# Patient Record
Sex: MALE | Race: WHITE
[De-identification: names, ages, dates, MRNs, and addresses within clinical notes are randomized per-mention and may not be internally consistent; named-entity substitution may affect disease eponyms.]

---
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
## 2019-01-14 NOTE — ER DOCUMENT REPORT
HPI





- HPI


Time Seen by Provider: 01/14/19 16:46


Pain Level: 4


Notes: 





Patient is an otherwise healthy 23-year-old male who presents with chief 

complaint of fever, sore throat, cough, congestion and shortness of breath over 

the last 2 days.  He states that he has been taking over-the-counter medications

at home with minimal relief.  He denies any past medical history and reports all

immunizations are up-to-date.








- CONSTITUTIONAL


Constitutional: REPORTS: Fever, Chills





- NEURO


Neurology: REPORTS: Weakness - generallized





Past Medical History





- General


Information source: Patient





- Social History


Smoking Status: Current Every Day Smoker


Frequency of alcohol use: None


Drug Abuse: None


Family History: Reviewed & Not Pertinent


Patient has suicidal ideation: No


Patient has homicidal ideation: No





- Medical History


Medical History: Negative


Renal/ Medical History: Denies: Hx Peritoneal Dialysis


Surgical Hx: Negative





- Immunizations


Immunizations up to date: Yes





Vertical Provider Document





- CONSTITUTIONAL


Notes: 





PHYSICAL EXAMINATION:





GENERAL: Well-appearing, well-nourished and in no acute distress.





HEAD: Atraumatic, normocephalic.





EYES: Pupils equal round extraocular movements intact,  conjunctiva are normal.





ENT: Nares patent with clear rhinorrhea.  Bilateral TMs unremarkable.  

Oropharynx mildly erythematous without any tonsillar swelling or exudates noted.





NECK: Normal range of motion





LUNGS: No respiratory distress, bilateral expiratory wheezing noted.





Musculoskeletal: Normal range of motion





NEUROLOGICAL:  Normal speech, normal gait. 





PSYCH: Normal mood, normal affect.





SKIN: Warm, Dry, normal turgor, no rashes or lesions noted.





- INFECTION CONTROL


TRAVEL OUTSIDE OF THE [place] IN LAST 30 DAYS: No





Course





- Re-evaluation


Re-evalutation: 





Patient's lung sounds improved after administration of breathing treatment.  Flu

test is negative.  Likely viral upper respiratory illness.  Patient will be 

discharged home in stable condition. 








- Vital Signs


Vital signs: 


                                        











Temp Pulse Resp BP Pulse Ox


 


 100.8 F H  103 H  16   125/76   97 


 


 01/14/19 15:39  01/14/19 15:39  01/14/19 15:39  01/14/19 15:39  01/14/19 15:39














Discharge





- Discharge


Clinical Impression: 


 Bronchitis





Condition: Stable


Disposition: HOME, SELF-CARE


Additional Instructions: 


BRONCHITIS:





     You have acute bronchitis.  This disease is an infection or inflammation of

the air passageways in your lungs.  Symptoms usually include cough, low grade 

fever, shortness of breath, and wheezing.  The cough usually persists for a 

couple of weeks.


     Most cases of bronchitis get better without antibiotics.  We prescribe 

antibiotics when we believe bacteria are damaging your airways, or if there's 

high risk the bronchitis will worsen into pneumonia.


     Increase your fluid intake. A cool mist humidifier may make your lungs more

comfortable.  An expectorant (cough medicine that loosens phlegm) can help.  If 

you smoke, STOP!!!  Recovery from bronchitis can be somewhat slow, but you 

should see improvement within a day or two.


     Repeated episodes of bronchitis may result in lung damage -- for example, 

chronic bronchitis, recurrent pneumonias, or emphysema.


     Call the doctor if you develop increasing fever, shortness of breath, chest

pain, bloody sputum, or otherwise worsen.  If you have not improved at all after

several days, contact the physician.








BRONCHITIS WITH BRONCHOSPASM (WHEEZING):





     You have  bronchitis with bronchospasm (wheezing).  Sometimes people 

develop wheezing with a chest cold.  This occurs either because of an underlying

tendency toward asthma or because the virus itself irritates the bronchial 

tubes.  This irritation causes cough, shortness of breath, and wheezing.


     Emergency treatment of bronchospasm may include adrenaline shots or 

bronchodilator aerosol.  You may feel lightheaded and have a rapid pulse for an 

hour or two.  Rest and get plenty of fluids.


     At home, we'll treat you with a bronchodilator inhaler. Corticosteroids may

be required for some patients. Until you recover, avoid chemical fumes, dusts, 

pollens, and exercising in very cold or dry air. If you smoke, stop now!


     Most cases of bronchitis get better without antibiotics.  We prescribe 

antibiotics when we believe bacteria are damaging your airways, or if there's 

high risk the bronchitis will worsen into pneumonia. Increase your fluid intake.

A cool mist humidifier may make your lungs more comfortable.  An expectorant 

(cough medicine that loosens phlegm) can help.


     Repeated episodes of bronchitis and bronchospasm may result in lung damage 

-- for example, chronic bronchitis, recurrent pneumonias, or emphysema. If you 

develop a fever, increased wheezing, chest pain, or severe shortness of breath, 

you should contact the doctor immediately.








DECONGESTANT MEDICATION:


     A decongestant medicine has been prescribed.  Often this medicine is 

combined in the same tablet with an antihistamine or expectorant. This type of 

medicine is helpful in treating a bad cold or sinus condition, as well as in 

treatment of the nasal congestion of hay fever.  It is not of much benefit for 

lung infections.


     Decongestant medicines are related to stimulants.  They can cause an 

increase in blood pressure and heart rate.  Persons with heart disease and high 

blood pressure should not take decongestants without discussing this with the 

physician.


     If you develop palpitations, chest pain, headache, or tremors, stop the 

medicine and consult your physician.








COUGH-SUPPRESSANT & EXPECTORANT MEDICATION:


     You are to use a cough medication as needed for relief of symptoms.  This 

medicine is a combination of an expectorant (to make the mucous thinner and more

easily "coughed up") and a cough suppressant (to reduce the frequency of 

coughing).


     The cough-suppressant medicine is related to narcotics.  You may experience

mild nausea and sleepiness.  Some patients who are very sensitive to narcotics 

may have stomach pain from this medicine. Taking the medicine with food reduces 

these side effects.  Do not drive or work with machinery until you know how this

medicine affects you.


     The expectorant should have no side effects.  Iodine-containing 

expectorants (such as organidin) should not be taken by persons with active 

thyroid disease unless approved by your doctor.


     Call the doctor if you develop shortness of breath, hives, rash, itching, 

lightheadedness, or severe nausea and vomiting.








INHALED BRONCHODILATORS:


     You have received a treatment of and/or prescription for an inhaled 

bronchodilator -- a medication which stimulates the airways in the lung to 

dilate.  This improves the flow of air in asthma, bronchitis, and emphysema.


     These medicines have some similarity to adrenaline, and can cause similar 

side effects:  shakiness, racing heart, and a sense of nervousness.  These side 

effects decrease with time.  Contact your doctor if these side effects are 

severe.


     Do not over-use the medicine.  Too-frequent use of the inhaler may make it 

ineffective.  Call your doctor if the inhaler is not controlling your symptoms 

at the prescribed doses.








STEROID MEDICATION:


     You have been given an injection of or oral medicine of the 

cortisone/steroid class.  This medication is used to control inflammation or 

allergy.  Sachin t is usually only given for a short period of time, until the 

acute process subsides.


     There are usually no side effects from short-term use of cortisone-like 

medications.  Some persons feel an increased sense of well-being and are not 

sleepy at bedtime.  Long-term use of cortisone medications is best avoided, 

unless required for a severe condition.  If your condition does not remit, or 

relapses after the course of corticosteroid medication, you should consult your 

physician.








ANTIBIOTIC THERAPY:


     You have been given an antibiotic prescription.  It's important that you 

take all the medication, unless instructed otherwise by your physician.  Failure

to complete the entire course can result in relapse of your condition.


     Common side effects of antibiotics include nausea, intestinal cramping, or 

diarrhea.  Women may develop vaginal yeast infections, and babies can get yeast 

(thrush) in the mouth following the use of antibiotics.  Contact your physician 

if you develop significant side effects from this medication.


     Allergy to this antibiotic can result in hives, wheezing, faintness, or 

itching.  If symptoms of allergy occur, stop the medication and call your 

doctor.








USE OF ACETAMINOPHEN (Tylenol):


     Acetaminophen may be taken for pain relief or fever control. It's much 

safer than aspirin, offering a wider range of "safe" dosages.  It is safe during

pregnancy.  Some brand names are Tylenol, Panadol, Datril, Anacin 3, Tempra, and

Liquiprin. Acetaminophen can be repeated every four hours.  The following are 

maximum recommended dosages:


>89 pounds or adults          650 mg to 900 mg


Acetaminophen can be repeated every four hours.  Maximum dose not to exceed 4000

mg a day.








SMOKING:


     If you smoke, you should stop smoking.  The tar and chemicals in cigarette 

smoke are harmful.  Smoking has been shown to cause:


          emphysema


          chronic bronchitis


          lung cancer


          mouth and throat cancer


          stomach and pancreas cancer


          premature aging


          birth defects


     In addition, smoking increases ear and lung infections in children of 

smokers.








FOLLOW-UP CARE:


If you have been referred to a physician for follow-up care, call the 

physicians office for an appointment as you were instructed or within the next 

two days.  If you experience worsening or a significant change in your symptoms,

notify the physician immediately or return to the Emergency Department at any 

time for re-evaluation.





Prescriptions: 


Benzonatate [Tessalon Perles 100 mg Capsule] 100 mg PO Q8HP PRN #40 capsule


 PRN Reason: 


Fluticasone Propionate [Flonase Nasal Spray 50 Mcg/Spray 16 gm] 2 sprays NASL 

Q12 #1 inhaler


Prednisone [Deltasone 20 mg Tablet] 3 tab PO DAILY 4 Days #12 tablet


Forms:  Return to Work

## 2019-02-25 ENCOUNTER — HOSPITAL ENCOUNTER (EMERGENCY)
Dept: HOSPITAL 62 - ER | Age: 24
Discharge: LEFT BEFORE DISCHARGE | End: 2019-02-25
Payer: SELF-PAY

## 2019-02-25 VITALS — SYSTOLIC BLOOD PRESSURE: 129 MMHG | DIASTOLIC BLOOD PRESSURE: 98 MMHG

## 2019-02-25 DIAGNOSIS — Z53.21: Primary | ICD-10-CM

## 2019-07-17 ENCOUNTER — HOSPITAL ENCOUNTER (EMERGENCY)
Dept: HOSPITAL 62 - ER | Age: 24
LOS: 1 days | Discharge: HOME | End: 2019-07-18
Payer: COMMERCIAL

## 2019-07-17 VITALS — DIASTOLIC BLOOD PRESSURE: 94 MMHG | SYSTOLIC BLOOD PRESSURE: 174 MMHG

## 2019-07-17 DIAGNOSIS — R00.2: ICD-10-CM

## 2019-07-17 DIAGNOSIS — F41.1: Primary | ICD-10-CM

## 2019-07-17 DIAGNOSIS — R07.9: ICD-10-CM

## 2019-07-17 LAB
ADD MANUAL DIFF: NO
ALBUMIN SERPL-MCNC: 5.3 G/DL (ref 3.5–5)
ALP SERPL-CCNC: 67 U/L (ref 38–126)
ALT SERPL-CCNC: 29 U/L (ref 21–72)
ANION GAP SERPL CALC-SCNC: 12 MMOL/L (ref 5–19)
APPEARANCE UR: CLEAR
APTT PPP: (no result) S
AST SERPL-CCNC: 24 U/L (ref 17–59)
BASOPHILS # BLD AUTO: 0 10^3/UL (ref 0–0.2)
BASOPHILS NFR BLD AUTO: 0.2 % (ref 0–2)
BILIRUB DIRECT SERPL-MCNC: 0.1 MG/DL (ref 0–0.4)
BILIRUB SERPL-MCNC: 0.7 MG/DL (ref 0.2–1.3)
BILIRUB UR QL STRIP: NEGATIVE
BUN SERPL-MCNC: 14 MG/DL (ref 7–20)
CALCIUM: 10.2 MG/DL (ref 8.4–10.2)
CHLORIDE SERPL-SCNC: 101 MMOL/L (ref 98–107)
CO2 SERPL-SCNC: 28 MMOL/L (ref 22–30)
EOSINOPHIL # BLD AUTO: 0 10^3/UL (ref 0–0.6)
EOSINOPHIL NFR BLD AUTO: 0.1 % (ref 0–6)
ERYTHROCYTE [DISTWIDTH] IN BLOOD BY AUTOMATED COUNT: 13.2 % (ref 11.5–14)
GLUCOSE SERPL-MCNC: 103 MG/DL (ref 75–110)
GLUCOSE UR STRIP-MCNC: NEGATIVE MG/DL
HCT VFR BLD CALC: 46.5 % (ref 37.9–51)
HGB BLD-MCNC: 16.4 G/DL (ref 13.5–17)
KETONES UR STRIP-MCNC: NEGATIVE MG/DL
LYMPHOCYTES # BLD AUTO: 1.1 10^3/UL (ref 0.5–4.7)
LYMPHOCYTES NFR BLD AUTO: 8.3 % (ref 13–45)
MCH RBC QN AUTO: 28.7 PG (ref 27–33.4)
MCHC RBC AUTO-ENTMCNC: 35.3 G/DL (ref 32–36)
MCV RBC AUTO: 81 FL (ref 80–97)
MONOCYTES # BLD AUTO: 1 10^3/UL (ref 0.1–1.4)
MONOCYTES NFR BLD AUTO: 7 % (ref 3–13)
NEUTROPHILS # BLD AUTO: 11.6 10^3/UL (ref 1.7–8.2)
NEUTS SEG NFR BLD AUTO: 84.4 % (ref 42–78)
NITRITE UR QL STRIP: NEGATIVE
PH UR STRIP: 6 [PH] (ref 5–9)
PLATELET # BLD: 220 10^3/UL (ref 150–450)
POTASSIUM SERPL-SCNC: 4.3 MMOL/L (ref 3.6–5)
PROT SERPL-MCNC: 8.5 G/DL (ref 6.3–8.2)
PROT UR STRIP-MCNC: NEGATIVE MG/DL
RBC # BLD AUTO: 5.73 10^6/UL (ref 4.35–5.55)
SODIUM SERPL-SCNC: 141.4 MMOL/L (ref 137–145)
SP GR UR STRIP: 1
TOTAL CELLS COUNTED % (AUTO): 100 %
UROBILINOGEN UR-MCNC: NEGATIVE MG/DL (ref ?–2)
WBC # BLD AUTO: 13.8 10^3/UL (ref 4–10.5)

## 2019-07-17 PROCEDURE — 93010 ELECTROCARDIOGRAM REPORT: CPT

## 2019-07-17 PROCEDURE — 84443 ASSAY THYROID STIM HORMONE: CPT

## 2019-07-17 PROCEDURE — 85025 COMPLETE CBC W/AUTO DIFF WBC: CPT

## 2019-07-17 PROCEDURE — 81001 URINALYSIS AUTO W/SCOPE: CPT

## 2019-07-17 PROCEDURE — 99284 EMERGENCY DEPT VISIT MOD MDM: CPT

## 2019-07-17 PROCEDURE — 36415 COLL VENOUS BLD VENIPUNCTURE: CPT

## 2019-07-17 PROCEDURE — 80053 COMPREHEN METABOLIC PANEL: CPT

## 2019-07-17 PROCEDURE — 93005 ELECTROCARDIOGRAM TRACING: CPT

## 2019-07-17 PROCEDURE — 71046 X-RAY EXAM CHEST 2 VIEWS: CPT

## 2019-07-17 NOTE — ER DOCUMENT REPORT
ED General





- General


Chief Complaint: Chest Pain


Stated Complaint: PANIC ATTACK


Time Seen by Provider: 07/17/19 21:59


TRAVEL OUTSIDE OF THE [place] IN LAST 30 DAYS: No





- Related Data


Allergies/Adverse Reactions: 


                                        





No Known Allergies Allergy (Unverified 01/14/19 15:37)


   











Past Medical History





- Social History


Family History: Reviewed & Not Pertinent


Renal/ Medical History: Denies: Hx Peritoneal Dialysis





- Immunizations


Immunizations up to date: Yes





Physical Exam





- Vital signs


Vitals: 





                                        











Temp Pulse Resp BP Pulse Ox


 


 98.5 F   88   20   174/94 H  99 


 


 07/17/19 20:38  07/17/19 20:38  07/17/19 20:38  07/17/19 20:38  07/17/19 20:38














Course





- Vital Signs


Vital signs: 





                                        











Temp Pulse Resp BP Pulse Ox


 


 98.5 F   88   20   174/94 H  99 


 


 07/17/19 20:38  07/17/19 20:38  07/17/19 20:38  07/17/19 20:38  07/17/19 20:38














- Laboratory


Result Diagrams: 


                                 07/17/19 22:45





                                 07/17/19 22:45





- EKG Interpretation by Me


Additional EKG results interpreted by me: 





07/17/19 22:53


EKG is reviewed and interpreted by me.  EKG shows sinus rhythm with a rate of 92

bpm.  No ST segment elevation or depression.  No ischemic T wave inversions.  NV

interval, QRS duration, QT intervals are within normal range.  No old EKG 

available for comparison.

## 2019-07-17 NOTE — RADIOLOGY REPORT (SQ)
EXAM DESCRIPTION: 



XR CHEST 2 VIEWS



COMPLETED DATE/TME:  07/17/2019 22:03



CLINICAL HISTORY: 



23 years, Male, chest pain, palpitations



COMPARISON:

None.



NUMBER OF VIEWS:





TECHNIQUE:





LIMITATIONS:

None.



FINDINGS:



No evidence of pulmonary infiltrate or pleural effusion.



The heart and mediastinum are unremarkable.



Pulmonary vascularity appears normal.



IMPRESSION:



Normal chest x-ray.





copyright 2011 [employer]- All Rights Reserved

## 2019-07-17 NOTE — ER DOCUMENT REPORT
ED Medical Screen (RME)





- General


Chief Complaint: Chest Pain


Stated Complaint: PANIC ATTACK


Time Seen by Provider: 07/17/19 21:59


Notes: 





Patient is a 23-year-old male presents to the emergency department with a chief 

complaint of palpitations.  Patient states that recently he has had panic 

attacks a daily for the past 2 to 3 weeks.  Mother states that patient was on 

antianxiety medication while in high school but was taken off of the medications

as he was doing well.  Patient states he is not under any recent stress and is 

not sure why he is having these symptoms.  Patient states he is not having chest

pain but a feeling of palpitations at this time.  States he did have chest 

discomfort located on the right side earlier today.  Patient states on the way 

here he was driving and felt like he was hyperventilating and was going to pass 

out.  Patient states he feels very tired and restless.  Mother states he has a 

history of an irregular heartbeat but he was diagnosed at age 14 or 15 but has 

not had any issues with this since.  Patient does have a psychology appointment 

on August 2.


TRAVEL OUTSIDE OF THE [place] IN LAST 30 DAYS: No





- Related Data


Allergies/Adverse Reactions: 


                                        





No Known Allergies Allergy (Unverified 01/14/19 15:37)


   











Past Medical History


Renal/ Medical History: Denies: Hx Peritoneal Dialysis





- Immunizations


Immunizations up to date: Yes





Physical Exam





- Vital signs


Vitals: 





                                        











Temp Pulse Resp BP Pulse Ox


 


 98.5 F   88   20   174/94 H  99 


 


 07/17/19 20:38  07/17/19 20:38  07/17/19 20:38  07/17/19 20:38  07/17/19 20:38














- Abdominal


Inspection: Normal


Distension: No distension


Bowel sounds: Normal


Tenderness: Nontender


Organomegaly: No organomegaly





Course





- Re-evaluation


Re-evalutation: 





07/17/19 22:02


I have greeted and performed a rapid initial assessment of this patient.  A co

mprehensive ED assessment and evaluation of the patient, analysis of test 

results and completion of the medical decision making process will be conducted 

by additional ED providers.








- Vital Signs


Vital signs: 





                                        











Temp Pulse Resp BP Pulse Ox


 


 98.5 F   88   20   174/94 H  99 


 


 07/17/19 20:38  07/17/19 20:38  07/17/19 20:38  07/17/19 20:38  07/17/19 20:38

## 2019-07-18 NOTE — ER DOCUMENT REPORT
ED General





- General


Chief Complaint: Chest Pain


Stated Complaint: PANIC ATTACK


Time Seen by Provider: 07/17/19 21:59


Notes: 


23-year-old male presents to the emergency department with a chief complaint of 

palpitations.  Patient states that recently he has had panic attacks a daily for

the past 2 to 3 weeks.  Patient states he was on Lexapro and Wellbutrin while in

high school but was taken off of the medications as he was doing well.  Patient 

states he is not under any recent stress and is not sure why he is having these 

symptoms.  Patient states he is not having chest pain but a feeling of 

palpitations that have resolved at this time.  States he did have chest 

discomfort located on the right side earlier today.  On the way here he was 

driving and felt like he was hyperventilating and was going to pass out.  

Patient states he feels very tired and restless.  Mother states he has a history

of an irregular heartbeat but he was diagnosed at age 14 or 15 but has not had 

any issues with this since.  Patient does have a psychology appointment on 

August 2.


TRAVEL OUTSIDE OF THE [place] IN LAST 30 DAYS: No





- Related Data


Allergies/Adverse Reactions: 


                                        





No Known Allergies Allergy (Unverified 01/14/19 15:37)


   











Past Medical History





- Social History


Smoking Status: Unknown if Ever Smoked


Family History: Reviewed & Not Pertinent


Renal/ Medical History: Denies: Hx Peritoneal Dialysis





- Immunizations


Immunizations up to date: Yes





Review of Systems





- Review of Systems


Constitutional: No symptoms reported


EENT: No symptoms reported


Cardiovascular: See HPI


Respiratory: See HPI


Gastrointestinal: No symptoms reported


Genitourinary: No symptoms reported


Male Genitourinary: No symptoms reported


Musculoskeletal: No symptoms reported


Skin: No symptoms reported


Hematologic/Lymphatic: No symptoms reported


Neurological/Psychological: See HPI





Physical Exam





- Vital signs


Vitals: 


                                        











Temp Pulse Resp BP Pulse Ox


 


 98.5 F   88   20   174/94 H  99 


 


 07/17/19 20:38  07/17/19 20:38  07/17/19 20:38  07/17/19 20:38  07/17/19 20:38














- Notes


Notes: 


PHYSICAL EXAMINATION:





Reviewed vital signs and charting by RN





GENERAL: Alert, interacts well. No acute distress.


HEAD: Normocephalic, atraumatic.


EYES: Pupils equal and round. Extraocular movements intact.


ENT: Oral mucosa moist, tongue midline. 


NECK: Full range of motion. Trachea midline.


LUNGS: Clear to auscultation bilaterally, no wheezes, rales, or rhonchi. No 

respiratory distress.


HEART: Regular rate and rhythm. No murmur


ABDOMEN: soft, non-tender.  No distention. Bowel sounds present


EXTREMITIES: Moves all 4 extremities spontaneously. No edema,  No cyanosis.


PSYCH: Normal affect, normal mood.


SKIN: Warm, dry, normal turgor. No rashes or lesions noted.








Course





- Re-evaluation


Re-evalutation: 





07/18/19 02:32


Patient is well-appearing and states that his symptoms have resolved.  He is 

having daily panic attacks for the last couple of weeks.  I told him that 

benzodiazepines told him that that is just a crutch and will not solve the root 

of his problem.  EKG normal sinus rhythm with a rate of 92.  Lab work was all 

within normal limits.  Vital signs are within normal limits.  At this time 

patient is stable to discharge home and follow-up with primary doctor.





- Vital Signs


Vital signs: 


                                        











Temp Pulse Resp BP Pulse Ox


 


 98.5 F   88   20   174/94 H  99 


 


 07/17/19 20:38  07/17/19 20:38  07/17/19 20:38  07/17/19 20:38  07/17/19 20:38














- Laboratory


Result Diagrams: 


                                 07/17/19 22:45





                                 07/17/19 22:45


Laboratory results interpreted by me: 


                                        











  07/17/19 07/17/19





  22:45 22:45


 


WBC  13.8 H 


 


RBC  5.73 H 


 


Seg Neutrophils %  84.4 H 


 


Lymphocytes %  8.3 L 


 


Absolute Neutrophils  11.6 H 


 


Total Protein   8.5 H


 


Albumin   5.3 H














Discharge





- Discharge


Clinical Impression: 


 Palpitations, Anxiety attack





Condition: Good


Disposition: HOME, SELF-CARE


Additional Instructions: 


You were seen today for a panic attack. Please return if you develop recurrance 

of your symptoms, thoughts of wanting to harm yourself, or any other symptoms 

that are concerning to you. Follow-up with your primary doctor or mental health 

provider regarding today's ED visit.

## 2020-01-21 ENCOUNTER — HOSPITAL ENCOUNTER (EMERGENCY)
Dept: HOSPITAL 62 - ER | Age: 25
Discharge: HOME | End: 2020-01-21
Payer: COMMERCIAL

## 2020-01-21 VITALS — SYSTOLIC BLOOD PRESSURE: 102 MMHG | DIASTOLIC BLOOD PRESSURE: 52 MMHG

## 2020-01-21 DIAGNOSIS — R19.7: ICD-10-CM

## 2020-01-21 DIAGNOSIS — R11.2: Primary | ICD-10-CM

## 2020-01-21 DIAGNOSIS — D72.829: ICD-10-CM

## 2020-01-21 LAB
ABSOLUTE LYMPHOCYTES# (MANUAL): 0 10^3/UL (ref 0.5–4.7)
ABSOLUTE MONOCYTES # (MANUAL): 2.1 10^3/UL (ref 0.1–1.4)
ADD MANUAL DIFF: YES
ALBUMIN SERPL-MCNC: 5.5 G/DL (ref 3.5–5)
ALP SERPL-CCNC: 66 U/L (ref 38–126)
ANION GAP SERPL CALC-SCNC: 17 MMOL/L (ref 5–19)
APPEARANCE UR: (no result)
APTT PPP: (no result) S
AST SERPL-CCNC: 37 U/L (ref 17–59)
BASOPHILS NFR BLD MANUAL: 0 % (ref 0–2)
BILIRUB DIRECT SERPL-MCNC: 0.2 MG/DL (ref 0–0.4)
BILIRUB SERPL-MCNC: 0.8 MG/DL (ref 0.2–1.3)
BILIRUB UR QL STRIP: NEGATIVE
BUN SERPL-MCNC: 22 MG/DL (ref 7–20)
CALCIUM: 10.5 MG/DL (ref 8.4–10.2)
CHLORIDE SERPL-SCNC: 98 MMOL/L (ref 98–107)
CO2 SERPL-SCNC: 26 MMOL/L (ref 22–30)
EOSINOPHIL NFR BLD MANUAL: 1 % (ref 0–6)
ERYTHROCYTE [DISTWIDTH] IN BLOOD BY AUTOMATED COUNT: 12.8 % (ref 11.5–14)
GLUCOSE SERPL-MCNC: 156 MG/DL (ref 75–110)
GLUCOSE UR STRIP-MCNC: NEGATIVE MG/DL
HCT VFR BLD CALC: 50 % (ref 37.9–51)
HGB BLD-MCNC: 17.6 G/DL (ref 13.5–17)
KETONES UR STRIP-MCNC: NEGATIVE MG/DL
MCH RBC QN AUTO: 29 PG (ref 27–33.4)
MCHC RBC AUTO-ENTMCNC: 35.3 G/DL (ref 32–36)
MCV RBC AUTO: 82 FL (ref 80–97)
MONOCYTES % (MANUAL): 12 % (ref 3–13)
NEUTS BAND NFR BLD MANUAL: 6 % (ref 3–5)
NITRITE UR QL STRIP: NEGATIVE
PH UR STRIP: 5 [PH] (ref 5–9)
PLATELET # BLD: 244 10^3/UL (ref 150–450)
PLATELET CLUMP BLD QL SMEAR: PRESENT
PLATELET COMMENT: ADEQUATE
POTASSIUM SERPL-SCNC: 4.8 MMOL/L (ref 3.6–5)
PROT SERPL-MCNC: 9.3 G/DL (ref 6.3–8.2)
PROT UR STRIP-MCNC: 30 MG/DL
RBC # BLD AUTO: 6.08 10^6/UL (ref 4.35–5.55)
RBC MORPH BLD: (no result)
SEGMENTED NEUTROPHILS % (MAN): 81 % (ref 42–78)
SP GR UR STRIP: 1.03
TOTAL CELLS COUNTED BLD: 100
UROBILINOGEN UR-MCNC: NEGATIVE MG/DL (ref ?–2)
VARIANT LYMPHS NFR BLD MANUAL: 0 % (ref 13–45)
WBC # BLD AUTO: 17.5 10^3/UL (ref 4–10.5)

## 2020-01-21 PROCEDURE — 85025 COMPLETE CBC W/AUTO DIFF WBC: CPT

## 2020-01-21 PROCEDURE — 89055 LEUKOCYTE ASSESSMENT FECAL: CPT

## 2020-01-21 PROCEDURE — 99284 EMERGENCY DEPT VISIT MOD MDM: CPT

## 2020-01-21 PROCEDURE — 96372 THER/PROPH/DIAG INJ SC/IM: CPT

## 2020-01-21 PROCEDURE — 36415 COLL VENOUS BLD VENIPUNCTURE: CPT

## 2020-01-21 PROCEDURE — 80053 COMPREHEN METABOLIC PANEL: CPT

## 2020-01-21 PROCEDURE — 81001 URINALYSIS AUTO W/SCOPE: CPT

## 2020-01-21 PROCEDURE — 87045 FECES CULTURE AEROBIC BACT: CPT

## 2020-01-21 PROCEDURE — 96360 HYDRATION IV INFUSION INIT: CPT

## 2020-01-21 PROCEDURE — 87205 SMEAR GRAM STAIN: CPT

## 2020-01-21 PROCEDURE — 96361 HYDRATE IV INFUSION ADD-ON: CPT

## 2020-01-21 NOTE — ER DOCUMENT REPORT
Entered by AMANDA DELGADILLO SCRIBE  01/21/20 0753 





Acting as scribe for:CHELA DUNBAR MD





ED GI/





- General


Chief Complaint: Vomiting/Diarrhea


Stated Complaint: VOMITING


Time Seen by Provider: 01/21/20 07:52


Primary Care Provider: 


NICOLE FLETCHER FNP-C [Primary Care Provider] - Follow up as needed


Mode of Arrival: Ambulatory


Information source: Patient


Notes: 





This 24 year old male patient presents to the ED today with complaints of nausea

, vomiting, and diarrhea that began at 1:00 AM this morning. Patient states that

he has had x10 episodes of vomiting and that his stool is liquidy and frequent. 

Patient states that he thinks he might have food poisoning because he started 

having the symptoms after eating mexican food last night. Patient denies any 

abdominal pain, cramping, or fevers.


TRAVEL OUTSIDE OF THE [place] IN LAST 30 DAYS: No





- Related Data


Allergies/Adverse Reactions: 


                                        





No Known Allergies Allergy (Unverified 01/14/19 15:37)


   











Past Medical History





- General


Information source: Patient





- Social History


Smoking Status: Never Smoker


Cigarette use (# per day): No


Chew tobacco use (# tins/day): No


Smoking Education Provided: No


Frequency of alcohol use: None


Occupation: [employer]


Family History: Reviewed & Not Pertinent


Patient has suicidal ideation: No


Patient has homicidal ideation: No


Past Surgical History: Reports: None





- Immunizations


Immunizations up to date: Yes





Review of Systems





- Review of Systems


Constitutional: See HPI.  denies: Fever


EENT: No symptoms reported


Cardiovascular: No symptoms reported


Respiratory: No symptoms reported


Gastrointestinal: See HPI, Diarrhea, Nausea, Vomiting.  denies: Abdominal pain


Genitourinary: No symptoms reported


Male Genitourinary: No symptoms reported


Musculoskeletal: No symptoms reported


Skin: No symptoms reported


Hematologic/Lymphatic: No symptoms reported


Neurological/Psychological: No symptoms reported


-: Yes All other systems reviewed and negative





Physical Exam





- Vital signs


Vitals: 


                                        











Pulse Resp BP Pulse Ox


 


 95   17   97/67 L  98 


 


 01/21/20 04:36  01/21/20 04:36  01/21/20 04:36  01/21/20 04:36














- General


General appearance: Alert





- HEENT


Head: Normocephalic, Atraumatic


Eyes: Normal


Pupils: PERRL


Mucous membranes: Dry





- Respiratory


Respiratory status: No respiratory distress


Chest status: Nontender


Breath sounds: Normal


Chest palpation: Normal





- Cardiovascular


Rhythm: Regular


Heart sounds: Normal auscultation


Murmur: No





- Abdominal


Inspection: Normal


Distension: No distension


Bowel sounds: Normal - Active bowel sounds


Tenderness: Nontender


Organomegaly: No organomegaly





- Back


Back: Normal, Nontender





- Extremities


General upper extremity: Normal inspection


General lower extremity: Normal inspection





- Neurological


Neuro grossly intact: Yes





- Psychological


Associated symptoms: Normal affect, Normal mood





- Skin


Skin Temperature: Warm


Skin Moisture: Dry


Skin Color: Normal





Course





- Vital Signs


Vital signs: 


                                        











Temp Pulse Resp BP Pulse Ox


 


 99.5 F   98   16   102/52 L  98 


 


 01/21/20 11:42  01/21/20 11:45  01/21/20 11:42  01/21/20 11:42  01/21/20 11:42














- Laboratory


Result Diagrams: 


                                 01/21/20 05:04





                                 01/21/20 05:04


Laboratory results interpreted by me: 


                                        











  01/21/20 01/21/20 01/21/20





  05:04 05:04 09:46


 


WBC  17.5 H  


 


RBC  6.08 H  


 


Hgb  17.6 H  


 


Seg Neuts % (Manual)  81 H  


 


Band Neutrophils %  6 H  


 


Lymphocytes % (Manual)  0 L  


 


Abs Neuts (Manual)  15.2 H  


 


Abs Lymphs (Manual)  0.0 L  


 


Abs Monocytes (Manual)  2.1 H  


 


BUN   22 H 


 


Glucose   156 H 


 


Calcium   10.5 H 


 


Total Protein   9.3 H 


 


Albumin   5.5 H 


 


Urine Protein    30 H














Discharge





- Discharge


Clinical Impression: 


 Nausea, vomiting and diarrhea, Leukocytosis





Condition: Stable


Disposition: HOME, SELF-CARE


Additional Instructions: 


Gastroenteritis:





     You most likely have gastroenteritis.  This is an irritation of the stomach

and intestinal tract.  It's usually caused by a virus, but can also be caused by

bacteria, toxins that cause food poisoning, or excessive alcohol intake.  

Symptoms may include fever, painful abdominal cramps, nausea, vomiting, and 

diarrhea.


     Start with small amounts (two to six ounces) of clear liquids (soft drinks,

herb teas, broth, etc).  Try to take fluids frequently even if you are vomiting,

to prevent dehydration.  When liquids are being consumed successfully, advance 

to small amounts of bland food (mashed potato, toast) for 6 - 12 hours.


     Gastroenteritis rarely requires medication. It goes away by itself. Use 

good handwashing so you don't spread germs. Wash underwear in very hot water.


     If symptoms are severe, talk to the doctor. Call your physician if blood 

appears in your vomitus or stool, if vomiting lasts longer than 24 hours, if the

abdominal pain worsens or becomes localized to one area, or if you develop high 

fever.








*****************************************************************

*******************************************************[id number]





Take the medications prescribed for nausea if needed.


Take Pepto-Bismol for the diarrhea.


Drink cool clear liquids today.


Get plenty of rest.


Follow-up with a local primary care provider if not improving.





RETURN TO THE EMERGENCY ROOM IF ANY NEW OR WORSENING SYMPTOMS.








Prescriptions: 


Ondansetron [Zofran Odt 4 mg Tablet] 1 - 2 tab PO Q4H PRN #12 tab.rapdis


 PRN Reason: 


Forms:  Return to Work


Referrals: 


NICOLE FLETCHER FNP-C [Primary Care Provider] - Follow up as needed


Scribe Attestation: 





01/21/20 08:27


I personally performed the services described in the documentation, reviewed and

edited the documentation which was dictated to the scribe in my presence, and it

accurately records my words and actions.





I personally performed the services described in the documentation, reviewed and

edited the documentation which was dictated to the scribe in my presence, and it

accurately records my words and actions.